# Patient Record
Sex: FEMALE | Race: OTHER | NOT HISPANIC OR LATINO | URBAN - METROPOLITAN AREA
[De-identification: names, ages, dates, MRNs, and addresses within clinical notes are randomized per-mention and may not be internally consistent; named-entity substitution may affect disease eponyms.]

---

## 2019-10-15 ENCOUNTER — OUTPATIENT (OUTPATIENT)
Dept: OUTPATIENT SERVICES | Facility: HOSPITAL | Age: 59
LOS: 1 days | Discharge: NURSING, REHAB OR LONG-TERM CARE | End: 2019-10-15
Payer: MEDICAID

## 2019-10-15 LAB
ALBUMIN SERPL ELPH-MCNC: 3.4 G/DL — SIGNIFICANT CHANGE UP (ref 3.3–5)
ALP SERPL-CCNC: 117 U/L — SIGNIFICANT CHANGE UP (ref 40–120)
ALT FLD-CCNC: 9 U/L — LOW (ref 10–45)
ANION GAP SERPL CALC-SCNC: 11 MMOL/L — SIGNIFICANT CHANGE UP (ref 5–17)
APTT BLD: 35.1 SEC — SIGNIFICANT CHANGE UP (ref 27.5–36.3)
AST SERPL-CCNC: 9 U/L — LOW (ref 10–40)
BASOPHILS # BLD AUTO: 0.11 K/UL — SIGNIFICANT CHANGE UP (ref 0–0.2)
BASOPHILS NFR BLD AUTO: 1.5 % — SIGNIFICANT CHANGE UP (ref 0–2)
BILIRUB SERPL-MCNC: 0.3 MG/DL — SIGNIFICANT CHANGE UP (ref 0.2–1.2)
BUN SERPL-MCNC: 21 MG/DL — SIGNIFICANT CHANGE UP (ref 7–23)
CALCIUM SERPL-MCNC: 9.9 MG/DL — SIGNIFICANT CHANGE UP (ref 8.4–10.5)
CHLORIDE SERPL-SCNC: 101 MMOL/L — SIGNIFICANT CHANGE UP (ref 96–108)
CHOLEST SERPL-MCNC: 141 MG/DL — SIGNIFICANT CHANGE UP (ref 10–199)
CK SERPL-CCNC: 22 U/L — LOW (ref 25–170)
CO2 SERPL-SCNC: 28 MMOL/L — SIGNIFICANT CHANGE UP (ref 22–31)
CREAT SERPL-MCNC: 0.66 MG/DL — SIGNIFICANT CHANGE UP (ref 0.5–1.3)
CRP SERPL-MCNC: 1.24 MG/DL — HIGH (ref 0–0.4)
EOSINOPHIL # BLD AUTO: 0.23 K/UL — SIGNIFICANT CHANGE UP (ref 0–0.5)
EOSINOPHIL NFR BLD AUTO: 3.1 % — SIGNIFICANT CHANGE UP (ref 0–6)
GLUCOSE SERPL-MCNC: 300 MG/DL — HIGH (ref 70–99)
HCT VFR BLD CALC: 30.7 % — LOW (ref 34.5–45)
HDLC SERPL-MCNC: 40 MG/DL — LOW
HGB BLD-MCNC: 9.8 G/DL — LOW (ref 11.5–15.5)
IMM GRANULOCYTES NFR BLD AUTO: 0.3 % — SIGNIFICANT CHANGE UP (ref 0–1.5)
INR BLD: 1.18 — HIGH (ref 0.88–1.16)
LIPID PNL WITH DIRECT LDL SERPL: 77 MG/DL — SIGNIFICANT CHANGE UP
LYMPHOCYTES # BLD AUTO: 1.71 K/UL — SIGNIFICANT CHANGE UP (ref 1–3.3)
LYMPHOCYTES # BLD AUTO: 23 % — SIGNIFICANT CHANGE UP (ref 13–44)
MCHC RBC-ENTMCNC: 28.8 PG — SIGNIFICANT CHANGE UP (ref 27–34)
MCHC RBC-ENTMCNC: 31.9 GM/DL — LOW (ref 32–36)
MCV RBC AUTO: 90.3 FL — SIGNIFICANT CHANGE UP (ref 80–100)
MONOCYTES # BLD AUTO: 0.52 K/UL — SIGNIFICANT CHANGE UP (ref 0–0.9)
MONOCYTES NFR BLD AUTO: 7 % — SIGNIFICANT CHANGE UP (ref 2–14)
NEUTROPHILS # BLD AUTO: 4.83 K/UL — SIGNIFICANT CHANGE UP (ref 1.8–7.4)
NEUTROPHILS NFR BLD AUTO: 65.1 % — SIGNIFICANT CHANGE UP (ref 43–77)
NRBC # BLD: 0 /100 WBCS — SIGNIFICANT CHANGE UP (ref 0–0)
PLATELET # BLD AUTO: 407 K/UL — HIGH (ref 150–400)
POTASSIUM SERPL-MCNC: 5.1 MMOL/L — SIGNIFICANT CHANGE UP (ref 3.5–5.3)
POTASSIUM SERPL-SCNC: 5.1 MMOL/L — SIGNIFICANT CHANGE UP (ref 3.5–5.3)
PROT SERPL-MCNC: 7.8 G/DL — SIGNIFICANT CHANGE UP (ref 6–8.3)
PROTHROM AB SERPL-ACNC: 13.4 SEC — HIGH (ref 10–12.9)
RBC # BLD: 3.4 M/UL — LOW (ref 3.8–5.2)
RBC # FLD: 14.2 % — SIGNIFICANT CHANGE UP (ref 10.3–14.5)
SODIUM SERPL-SCNC: 140 MMOL/L — SIGNIFICANT CHANGE UP (ref 135–145)
TOTAL CHOLESTEROL/HDL RATIO MEASUREMENT: 3.5 RATIO — SIGNIFICANT CHANGE UP (ref 3.3–7.1)
TRIGL SERPL-MCNC: 122 MG/DL — SIGNIFICANT CHANGE UP (ref 10–149)
WBC # BLD: 7.42 K/UL — SIGNIFICANT CHANGE UP (ref 3.8–10.5)
WBC # FLD AUTO: 7.42 K/UL — SIGNIFICANT CHANGE UP (ref 3.8–10.5)

## 2019-10-15 PROCEDURE — 82550 ASSAY OF CK (CPK): CPT

## 2019-10-15 PROCEDURE — 80053 COMPREHEN METABOLIC PANEL: CPT

## 2019-10-15 PROCEDURE — C1894: CPT

## 2019-10-15 PROCEDURE — C1887: CPT

## 2019-10-15 PROCEDURE — C1760: CPT

## 2019-10-15 PROCEDURE — 93459 L HRT ART/GRFT ANGIO: CPT | Mod: 26

## 2019-10-15 PROCEDURE — 85730 THROMBOPLASTIN TIME PARTIAL: CPT

## 2019-10-15 PROCEDURE — C1769: CPT

## 2019-10-15 PROCEDURE — 85025 COMPLETE CBC W/AUTO DIFF WBC: CPT

## 2019-10-15 PROCEDURE — 93005 ELECTROCARDIOGRAM TRACING: CPT

## 2019-10-15 PROCEDURE — 86140 C-REACTIVE PROTEIN: CPT

## 2019-10-15 PROCEDURE — 93010 ELECTROCARDIOGRAM REPORT: CPT

## 2019-10-15 PROCEDURE — 85610 PROTHROMBIN TIME: CPT

## 2019-10-15 PROCEDURE — 93459 L HRT ART/GRFT ANGIO: CPT

## 2019-10-15 PROCEDURE — 82962 GLUCOSE BLOOD TEST: CPT

## 2019-10-15 PROCEDURE — 36415 COLL VENOUS BLD VENIPUNCTURE: CPT

## 2019-10-15 PROCEDURE — 80061 LIPID PANEL: CPT

## 2019-10-15 RX ORDER — METOPROLOL TARTRATE 50 MG
0.5 TABLET ORAL
Qty: 0 | Refills: 0 | DISCHARGE

## 2019-10-15 RX ORDER — ASPIRIN/CALCIUM CARB/MAGNESIUM 324 MG
325 TABLET ORAL ONCE
Refills: 0 | Status: COMPLETED | OUTPATIENT
Start: 2019-10-15 | End: 2019-10-15

## 2019-10-15 RX ORDER — ASPIRIN/CALCIUM CARB/MAGNESIUM 324 MG
1 TABLET ORAL
Qty: 0 | Refills: 0 | DISCHARGE

## 2019-10-15 RX ORDER — SODIUM CHLORIDE 9 MG/ML
500 INJECTION INTRAMUSCULAR; INTRAVENOUS; SUBCUTANEOUS
Refills: 0 | Status: DISCONTINUED | OUTPATIENT
Start: 2019-10-15 | End: 2019-10-15

## 2019-10-15 RX ORDER — PANTOPRAZOLE SODIUM 20 MG/1
1 TABLET, DELAYED RELEASE ORAL
Qty: 0 | Refills: 0 | DISCHARGE

## 2019-10-15 RX ORDER — INSULIN LISPRO 100/ML
0 VIAL (ML) SUBCUTANEOUS
Qty: 0 | Refills: 0 | DISCHARGE

## 2019-10-15 RX ORDER — CLOPIDOGREL BISULFATE 75 MG/1
1 TABLET, FILM COATED ORAL
Qty: 0 | Refills: 0 | DISCHARGE

## 2019-10-15 RX ORDER — INSULIN LISPRO 100/ML
VIAL (ML) SUBCUTANEOUS ONCE
Refills: 0 | Status: COMPLETED | OUTPATIENT
Start: 2019-10-15 | End: 2019-10-15

## 2019-10-15 RX ORDER — METFORMIN HYDROCHLORIDE 850 MG/1
1 TABLET ORAL
Qty: 0 | Refills: 0 | DISCHARGE

## 2019-10-15 RX ORDER — INSULIN GLARGINE 100 [IU]/ML
35 INJECTION, SOLUTION SUBCUTANEOUS
Qty: 0 | Refills: 0 | DISCHARGE

## 2019-10-15 RX ORDER — CLOPIDOGREL BISULFATE 75 MG/1
75 TABLET, FILM COATED ORAL ONCE
Refills: 0 | Status: COMPLETED | OUTPATIENT
Start: 2019-10-15 | End: 2019-10-15

## 2019-10-15 RX ORDER — ATORVASTATIN CALCIUM 80 MG/1
1 TABLET, FILM COATED ORAL
Qty: 0 | Refills: 0 | DISCHARGE

## 2019-10-15 RX ADMIN — SODIUM CHLORIDE 75 MILLILITER(S): 9 INJECTION INTRAMUSCULAR; INTRAVENOUS; SUBCUTANEOUS at 15:58

## 2019-10-15 RX ADMIN — CLOPIDOGREL BISULFATE 75 MILLIGRAM(S): 75 TABLET, FILM COATED ORAL at 15:54

## 2019-10-15 RX ADMIN — Medication 6: at 15:56

## 2019-10-15 RX ADMIN — Medication 325 MILLIGRAM(S): at 15:55

## 2019-10-15 NOTE — PROGRESS NOTE ADULT - SUBJECTIVE AND OBJECTIVE BOX
Interventional Cardiology PA SDA Discharge Note    Patient without complaints. Ambulated and voided without difficulties    Afebrile, VSS    Ext: Right Groin: no hematoma, no bruit, dressing; C/D/I    Pulses: intact RAD/DP/PT to baseline     A/P:  58 y/o F w/ FMHX CAD/MI, PMHX HTN, HLD, DM, Asthma (denies intubation), CAD s/p prior PCI s/p CABG 2011 in [facility] (last cath 2 yrs ago at [facility] – report pending), recent hospitalization 3 weeks ago to [facility] for UTI / Pyelonephritis / Sepsis per patient, who presented to her cardiologist c/o 6/10 intermittent "sharp" C/P radiating to the back/jaw occurring independent of rest or activity (CCS Angina Class 4 Sx). Associated symptoms include SOB, dizziness, diaphoresis, palpitations, LE edema, 2 pillow orthopnea, PND. Pt denies N/V, syncope. Echo in 9/2019 showed EF 55-60%. Stress Test in 9/2019 shows negative perfusion scan. Denies fever/chills, cough, abdominal pain, and diarrhea. Reports completed last course of Cefpodoxime 200mg BID completed 4 days ago. Due to pts risk factors, CCS Angina Class 4 Sx, h/o CAD, pt is referred for cardiac catheterization w/ possible intervention.   pt s/p diagnostic cardiac catheterization revealing non obstructive CAD, access  R groin PC.      1.	Stable for discharge as per attending Dr. Andrews after bed rest, pt voids, groin stable and 30 minutes of ambulation.  2.	Follow-up with PMD/Cardiologist Juliana on Friday 10/18/19  3.	Discharged forms signed and copies in chart Interventional Cardiology PA SDA Discharge Note    Patient without complaints. Ambulated and voided without difficulties    Afebrile, VSS    Ext: Right Groin: no hematoma, no bruit, dressing; C/D/I    Pulses: intact RAD/DP/PT to baseline     A/P:  58 y/o F w/ FMHX CAD/MI, PMHX HTN, HLD, DM, Asthma (denies intubation), CAD s/p prior PCI s/p CABG 2011 in [facility] (last cath 2 yrs ago at [facility] – report pending), recent hospitalization 3 weeks ago to [facility] for UTI / Pyelonephritis / Sepsis per patient, who presented to her cardiologist c/o 6/10 intermittent "sharp" C/P radiating to the back/jaw occurring independent of rest or activity (CCS Angina Class 4 Sx). Associated symptoms include SOB, dizziness, diaphoresis, palpitations, LE edema, 2 pillow orthopnea, PND. Pt denies N/V, syncope. Echo in 9/2019 showed EF 55-60%. Stress Test in 9/2019 shows negative perfusion scan. Denies fever/chills, cough, abdominal pain, and diarrhea. Reports completed last course of Cefpodoxime 200mg BID completed 4 days ago. Due to pts risk factors, CCS Angina Class 4 Sx, h/o CAD, pt is referred for cardiac catheterization w/ possible intervention.   pt s/p diagnostic cardiac catheterization revealing pLAD 100% ISR, D1 100% ISR, pLCx 100% ISR, mRCA 100%, dRPL 90% (small vessel), with patent LIMA-LAD, SVG-OM1 and SVG-RPDA grafts, EF 55%, EDP 12, access R groin PC      1.	Stable for discharge as per attending Dr. Andrews after bed rest, pt voids, groin stable and 30 minutes of ambulation.  2.	Follow-up with PMD/Cardiologist Juliana on Friday 10/18/19  3.	Discharged forms signed and copies in chart

## 2019-10-15 NOTE — H&P ADULT - NSHPPHYSICALEXAM_GEN_ALL_CORE
V/S 	BP: 124/54     HR: 80	   RR: 16   T: 98	  O2: 100% RA   	  GEN: NAD  PULM:  CTA B/L  HEENT: No JVD  CARD: RRR, S1 and S2   ABD: +BS, NT, soft/ND	  EXT: No Edema B/L LE  NEURO: A+Ox3, no focal deficit  PSYCH: Mood appropriate   PULSES: 2+ Radial b/l, 2+ Femoral b/l (no bruit b/l), DP 2+ b/l, PT + Doppler B/L  ASA III, Mallampati III  EKG SR @ 80bpm, 1st degree AVB, RBBB, TWI V2

## 2019-10-15 NOTE — H&P ADULT - HISTORY OF PRESENT ILLNESS
60 y/o F w/ FMHX CAD/MI, PMHX HTN, HLD, DM, Asthma (denies intubation), CAD s/p prior PCI s/p CABG 2011 in [facility] (last cath 2 yrs ago at [facility] – report pending), recent hospitalization 3 weeks ago to [facility] for UTI / Pyelonephritis / Sepsis per patient, who presented to her cardiologist c/o 6/10 intermittent "sharp" C/P radiating to the back/jaw occurring independent of rest or activity (CCS Angina Class 4 Sx). Associated symptoms include SOB, dizziness, diaphoresis, palpitations, LE edema, 2 pillow orthopnea, PND. Pt denies N/V, syncope. Echo in 9/2019 showed EF 55-60%. Stress Test in 9/2019 shows negative perfusion scan. Denies fever/chills, cough, abdominal pain, and diarrhea. Reports completed last course of Cefpodoxime 200mg BID completed 4 days ago.   Due to pts risk factors, CCS Angina Class 4 Sx, h/o CAD, pt is referred for cardiac catheterization w/ possible intervention.

## 2019-10-15 NOTE — H&P ADULT - ASSESSMENT
58 y/o F w/ FMHX CAD/MI, PMHX HTN, HLD, DM, Asthma, CAD s/p prior PCI s/p CABG 2011 in [facility] (last cath 2 yrs ago at [facility] – report pending), recent hospitalization 3 weeks ago to [facility] for UTI / Pyelonephritis / Sepsis per patient, w/ CCS Angina Class 4 Sx.   Due to pts risk factors, CCS Angina Class 4 Sx, h/o CAD, pt is referred for cardiac catheterization w/ possible intervention.   H/H 9.8/30. Pt denies bleeding, GI bleeding, hematemesis, hematuria, BRBPR or melena. Pt reports ASA/Plavix compliance and no missed doses in last 2 weeks. No h/o anemia. Denies h/o colonoscopy. No further work-up at this time as per Dr. Andrews. ASA 325mg and Plavix 75mg pre-cath per Dr. Andrews    IV NS@ 75cc/hr pre-cath.  Type of Sedation: Moderate  Candidate for Sedation: Yes  Risks & benefits of procedure and alternative therapy have been explained to the patient including but not limited to: allergic reaction, bleeding w/possible need for blood transfusion, infection, renal and vascular compromise, limb damage, arrhythmia, stroke, vessel dissection/perforation, Myocardial infarction, emergent CABG. Informed consent obtained and in chart

## 2019-10-15 NOTE — H&P ADULT - NSHPLABSRESULTS_GEN_ALL_CORE
9.8    7.42  )-----------( 407      ( 15 Oct 2019 14:57 )             30.7       10-15    140  |  101  |  21  ----------------------------<  300<H>  5.1   |  28  |  0.66    Ca    9.9      15 Oct 2019 14:57    TPro  7.8  /  Alb  3.4  /  TBili  0.3  /  DBili  x   /  AST  9<L>  /  ALT  9<L>  /  AlkPhos  117  10-15      PT/INR - ( 15 Oct 2019 14:57 )   PT: 13.4 sec;   INR: 1.18          PTT - ( 15 Oct 2019 14:57 )  PTT:35.1 sec    CARDIAC MARKERS ( 15 Oct 2019 14:57 )  x     / x     / 22 U/L / x     / x

## 2019-10-15 NOTE — H&P ADULT - NSICDXFAMILYHX_GEN_ALL_CORE_FT
FAMILY HISTORY:  FH: coronary artery disease, Uncle: MI @ age 70s, Sister @ age 70s, Father, Brother